# Patient Record
Sex: MALE | Employment: OTHER | URBAN - METROPOLITAN AREA
[De-identification: names, ages, dates, MRNs, and addresses within clinical notes are randomized per-mention and may not be internally consistent; named-entity substitution may affect disease eponyms.]

---

## 2020-10-27 ENCOUNTER — OFFICE VISIT (OUTPATIENT)
Dept: INFECTIOUS DISEASES | Facility: CLINIC | Age: 60
End: 2020-10-27
Payer: COMMERCIAL

## 2020-10-27 ENCOUNTER — CLINICAL SUPPORT (OUTPATIENT)
Dept: INFECTIOUS DISEASES | Facility: CLINIC | Age: 60
End: 2020-10-27
Payer: COMMERCIAL

## 2020-10-27 VITALS
HEART RATE: 59 BPM | WEIGHT: 194.25 LBS | HEIGHT: 73 IN | DIASTOLIC BLOOD PRESSURE: 92 MMHG | SYSTOLIC BLOOD PRESSURE: 140 MMHG | BODY MASS INDEX: 25.74 KG/M2 | TEMPERATURE: 99 F

## 2020-10-27 DIAGNOSIS — Z71.84 TRAVEL ADVICE ENCOUNTER: ICD-10-CM

## 2020-10-27 DIAGNOSIS — Z71.84 TRAVEL ADVICE ENCOUNTER: Primary | ICD-10-CM

## 2020-10-27 PROCEDURE — 99401 PREV MED CNSL INDIV APPRX 15: CPT | Mod: 25,S$GLB,, | Performed by: INTERNAL MEDICINE

## 2020-10-27 PROCEDURE — 90471 YELLOW FEVER VACCINE SQ: ICD-10-PCS | Mod: S$GLB,,, | Performed by: INTERNAL MEDICINE

## 2020-10-27 PROCEDURE — 99999 PR PBB SHADOW E&M-NEW PATIENT-LVL III: CPT | Mod: PBBFAC,,, | Performed by: INTERNAL MEDICINE

## 2020-10-27 PROCEDURE — 90471 IMMUNIZATION ADMIN: CPT | Mod: S$GLB,,, | Performed by: INTERNAL MEDICINE

## 2020-10-27 PROCEDURE — 90472 TYPHOID VICPS VACCINE IM: ICD-10-PCS | Mod: S$GLB,,, | Performed by: INTERNAL MEDICINE

## 2020-10-27 PROCEDURE — 90717 YELLOW FEVER VACCINE SUBQ: CPT | Mod: S$GLB,,, | Performed by: INTERNAL MEDICINE

## 2020-10-27 PROCEDURE — 90691 TYPHOID VACCINE IM: CPT | Mod: S$GLB,,, | Performed by: INTERNAL MEDICINE

## 2020-10-27 PROCEDURE — 90472 IMMUNIZATION ADMIN EACH ADD: CPT | Mod: S$GLB,,, | Performed by: INTERNAL MEDICINE

## 2020-10-27 PROCEDURE — 90691 TYPHOID VICPS VACCINE IM: ICD-10-PCS | Mod: S$GLB,,, | Performed by: INTERNAL MEDICINE

## 2020-10-27 PROCEDURE — 90717 YELLOW FEVER VACCINE SQ: ICD-10-PCS | Mod: S$GLB,,, | Performed by: INTERNAL MEDICINE

## 2020-10-27 PROCEDURE — 99401 PR PREVENT COUNSEL,INDIV,15 MIN: ICD-10-PCS | Mod: 25,S$GLB,, | Performed by: INTERNAL MEDICINE

## 2020-10-27 PROCEDURE — 99999 PR PBB SHADOW E&M-NEW PATIENT-LVL III: ICD-10-PCS | Mod: PBBFAC,,, | Performed by: INTERNAL MEDICINE

## 2020-10-27 RX ORDER — AZITHROMYCIN 500 MG/1
500 TABLET, FILM COATED ORAL DAILY
Qty: 3 TABLET | Refills: 0 | Status: CANCELLED | OUTPATIENT
Start: 2020-10-27 | End: 2020-10-30

## 2020-10-27 NOTE — PROGRESS NOTES
Subjective:      Patient ID: Sher Tellez is a 60 y.o. male.    Chief Complaint:Travel Consult ([place])      History of Present Illness    Patient here for travel counseling. He will be traveling to [facility] in [place].     Date of departure: 11/3/2020  Trip duration: 10 days  Lodging: local home  Visiting any other cities/municipalities: none  Recent patient reported vaccines:    Hepatitis A and B 10/23/2020   Td/Tdap 10/23/2020   Measles   Polio    Review of Systems   Constitution: Negative for chills, decreased appetite, fever, malaise/fatigue, night sweats, weight gain and weight loss.   HENT: Negative for congestion, ear pain, hearing loss, hoarse voice, sore throat and tinnitus.    Eyes: Negative for blurred vision, redness and visual disturbance.   Cardiovascular: Negative for chest pain, leg swelling and palpitations.   Respiratory: Negative for cough, hemoptysis, shortness of breath and sputum production.    Hematologic/Lymphatic: Negative for adenopathy. Does not bruise/bleed easily.   Skin: Negative for dry skin, itching, rash and suspicious lesions.   Musculoskeletal: Negative for back pain, joint pain, myalgias and neck pain.   Gastrointestinal: Negative for abdominal pain, constipation, diarrhea, heartburn, nausea and vomiting.   Genitourinary: Negative for dysuria, flank pain, frequency, hematuria, hesitancy and urgency.   Neurological: Negative for dizziness, headaches, numbness, paresthesias and weakness.   Psychiatric/Behavioral: Negative for depression and memory loss. The patient does not have insomnia and is not nervous/anxious.      Objective:   Physical Exam  Vitals signs and nursing note reviewed.   Constitutional:       Appearance: He is well-developed.   HENT:      Head: Normocephalic and atraumatic.   Eyes:      General: No scleral icterus.        Right eye: No discharge.         Left eye: No discharge.      Conjunctiva/sclera: Conjunctivae normal.   Pulmonary:      Effort: Pulmonary effort  is normal.   Musculoskeletal: Normal range of motion.   Skin:     General: Skin is warm and dry.   Neurological:      Mental Status: He is alert and oriented to person, place, and time.   Psychiatric:         Behavior: Behavior normal.         Thought Content: Thought content normal.         Judgment: Judgment normal.       Assessment:       1. Travel advice encounter          Plan:   Travel Counseling:     The Patient was provided with an extensive travel guidance packet which provides travel information specific to the patients itinerary.     The patient's medical history was reviewed and the patient was counseled on:   -Dietary precautions.   -Personal protective measures to prevent insect-borne diseases (e.g., malaria, dengue).   -Precautions to prevent exposure to rabies and seek treatment for possible exposures.   -Precautions against sun exposure.   -Precautions against development of DVT during flight.   -Personal and travel safety.    The patient's immunization history was reviewed and, based on the patient's itinerary, immunizations below were ordered:   Typhoid   Yellow fever    The patient was encouraged to contact us about any problems that may develop after immunization and possible side effects were reviewed.    The patient was instructed to purchase Imodium over the counter to take in case diarrhea (without blood or fever) develops. An antibiotic, azithromycin , was ordered for treatment if severe or bloody diarrhea develops and the patient was instructed on use and possible side effects.    The patient was also instructed to purchase insect repellent containing DEET or Picardin and apply according to repellent label instructions.      If indicated by the patients itinerary an anti-malarial agent was prescribed for malaria prophylaxis and possible side effects were reviewed. [place] does not currently require malaria prophylaxis.      The patient was instructed to contact us if problems develop  after travel.

## 2020-12-08 ENCOUNTER — HOSPITAL ENCOUNTER (OUTPATIENT)
Dept: MEDSURG UNIT | Facility: HOSPITAL | Age: 60
End: 2020-12-10
Attending: INTERNAL MEDICINE | Admitting: INTERNAL MEDICINE

## 2020-12-08 LAB
ABS NEUT (OLG): 8.94 X10(3)/MCL (ref 2.1–9.2)
ALBUMIN SERPL-MCNC: 4 GM/DL (ref 3.4–4.8)
ALBUMIN/GLOB SERPL: 1.2 RATIO (ref 1.1–2)
ALP SERPL-CCNC: 68 UNIT/L (ref 40–150)
ALT SERPL-CCNC: 31 UNIT/L (ref 0–55)
AST SERPL-CCNC: 26 UNIT/L (ref 5–34)
BASOPHILS # BLD AUTO: 0 X10(3)/MCL (ref 0–0.2)
BASOPHILS NFR BLD AUTO: 0 %
BILIRUB SERPL-MCNC: 0.5 MG/DL
BILIRUBIN DIRECT+TOT PNL SERPL-MCNC: 0.2 MG/DL (ref 0–0.5)
BILIRUBIN DIRECT+TOT PNL SERPL-MCNC: 0.3 MG/DL (ref 0–0.8)
BUN SERPL-MCNC: 14.7 MG/DL (ref 8.4–25.7)
CALCIUM SERPL-MCNC: 8.4 MG/DL (ref 8.8–10)
CHLORIDE SERPL-SCNC: 100 MMOL/L (ref 98–107)
CO2 SERPL-SCNC: 22 MMOL/L (ref 23–31)
CREAT SERPL-MCNC: 1.04 MG/DL (ref 0.73–1.18)
CRP SERPL HS-MCNC: 5.01 MG/DL
EOSINOPHIL # BLD AUTO: 0.1 X10(3)/MCL (ref 0–0.9)
EOSINOPHIL NFR BLD AUTO: 1 %
ERYTHROCYTE [DISTWIDTH] IN BLOOD BY AUTOMATED COUNT: 14.5 % (ref 11.5–17)
FERRITIN SERPL-MCNC: 296.57 NG/ML (ref 21.81–274.66)
GLOBULIN SER-MCNC: 3.4 GM/DL (ref 2.4–3.5)
GLUCOSE SERPL-MCNC: 119 MG/DL (ref 82–115)
HCT VFR BLD AUTO: 40.9 % (ref 42–52)
HGB BLD-MCNC: 13.4 GM/DL (ref 14–18)
LYMPHOCYTES # BLD AUTO: 0.6 X10(3)/MCL (ref 0.6–4.6)
LYMPHOCYTES NFR BLD AUTO: 6 %
MCH RBC QN AUTO: 30.5 PG (ref 27–31)
MCHC RBC AUTO-ENTMCNC: 32.8 GM/DL (ref 33–36)
MCV RBC AUTO: 93.2 FL (ref 80–94)
MONOCYTES # BLD AUTO: 1.2 X10(3)/MCL (ref 0.1–1.3)
MONOCYTES NFR BLD AUTO: 11 %
NEUTROPHILS # BLD AUTO: 8.94 X10(3)/MCL (ref 2.1–9.2)
NEUTROPHILS NFR BLD AUTO: 82 %
PLATELET # BLD AUTO: 257 X10(3)/MCL (ref 130–400)
PMV BLD AUTO: 9.8 FL (ref 9.4–12.4)
POTASSIUM SERPL-SCNC: 3.6 MMOL/L (ref 3.5–5.1)
PROT SERPL-MCNC: 7.4 GM/DL (ref 5.8–7.6)
RBC # BLD AUTO: 4.39 X10(6)/MCL (ref 4.7–6.1)
SARS-COV-2 RNA RESP QL NAA+PROBE: NOT DETECTED
SODIUM SERPL-SCNC: 133 MMOL/L (ref 136–145)
WBC # SPEC AUTO: 10.9 X10(3)/MCL (ref 4.5–11.5)

## 2020-12-10 LAB
ABS NEUT (OLG): 8.27 X10(3)/MCL (ref 2.1–9.2)
ALBUMIN SERPL-MCNC: 3.5 GM/DL (ref 3.4–4.8)
ALBUMIN/GLOB SERPL: 1.2 RATIO (ref 1.1–2)
ALP SERPL-CCNC: 65 UNIT/L (ref 40–150)
ALT SERPL-CCNC: 31 UNIT/L (ref 0–55)
AST SERPL-CCNC: 18 UNIT/L (ref 5–34)
BASOPHILS # BLD AUTO: 0 X10(3)/MCL (ref 0–0.2)
BASOPHILS NFR BLD AUTO: 0 %
BILIRUB SERPL-MCNC: 0.3 MG/DL
BILIRUBIN DIRECT+TOT PNL SERPL-MCNC: 0.1 MG/DL (ref 0–0.8)
BILIRUBIN DIRECT+TOT PNL SERPL-MCNC: 0.2 MG/DL (ref 0–0.5)
BUN SERPL-MCNC: 15 MG/DL (ref 8.4–25.7)
CALCIUM SERPL-MCNC: 8.9 MG/DL (ref 8.8–10)
CHLORIDE SERPL-SCNC: 101 MMOL/L (ref 98–107)
CO2 SERPL-SCNC: 23 MMOL/L (ref 23–31)
CREAT SERPL-MCNC: 0.93 MG/DL (ref 0.73–1.18)
D DIMER PPP IA.FEU-MCNC: 0.54 MCG/ML FEU (ref 0–0.5)
ERYTHROCYTE [DISTWIDTH] IN BLOOD BY AUTOMATED COUNT: 14.6 % (ref 11.5–17)
FLUAV AG UPPER RESP QL IA.RAPID: NEGATIVE
FLUAV AG UPPER RESP QL IA.RAPID: NOT DETECTED
FLUBV AG UPPER RESP QL IA.RAPID: NEGATIVE
FLUBV AG UPPER RESP QL IA.RAPID: NOT DETECTED
GLOBULIN SER-MCNC: 3 GM/DL (ref 2.4–3.5)
GLUCOSE SERPL-MCNC: 150 MG/DL (ref 82–115)
HCT VFR BLD AUTO: 44 % (ref 42–52)
HGB BLD-MCNC: 14.4 GM/DL (ref 14–18)
LYMPHOCYTES # BLD AUTO: 0.7 X10(3)/MCL (ref 0.6–4.6)
LYMPHOCYTES NFR BLD AUTO: 7 %
MCH RBC QN AUTO: 31 PG (ref 27–31)
MCHC RBC AUTO-ENTMCNC: 32.7 GM/DL (ref 33–36)
MCV RBC AUTO: 94.6 FL (ref 80–94)
MONOCYTES # BLD AUTO: 1 X10(3)/MCL (ref 0.1–1.3)
MONOCYTES NFR BLD AUTO: 10 %
NEUTROPHILS # BLD AUTO: 8.27 X10(3)/MCL (ref 2.1–9.2)
NEUTROPHILS NFR BLD AUTO: 83 %
PLATELET # BLD AUTO: 262 X10(3)/MCL (ref 130–400)
PMV BLD AUTO: 10.4 FL (ref 9.4–12.4)
POTASSIUM SERPL-SCNC: 4.5 MMOL/L (ref 3.5–5.1)
PROT SERPL-MCNC: 6.5 GM/DL (ref 5.8–7.6)
RBC # BLD AUTO: 4.65 X10(6)/MCL (ref 4.7–6.1)
SODIUM SERPL-SCNC: 134 MMOL/L (ref 136–145)
WBC # SPEC AUTO: 10 X10(3)/MCL (ref 4.5–11.5)

## 2022-04-30 NOTE — CONSULTS
Patient:   Sher Tellez             MRN: [id number]            FIN: [id number]               Age:   60 years     Sex:  Male     :  1960   Associated Diagnoses:   None   Author:   Ike DOMINGUEZ, [place]      Basic Information   Source of history:  Self, Medical record, Nursing.    Referral source:  Andrew Arreaga MD.    History limitation:  None.        Infectious disease evaluation and antibiotic management of a patient with acute febrile illness with suspicion of COVID-19 infection      History of Present Illness     60-year-old male, who is a  with no significant documented past medical history is admitted to [facility] on 2020, sent to to the hospital by his PMD, Dr. Arreaga for further evaluation of suspected COVID-19 infection.  Apparently he had presented to his office initially with complaints of shortness of breath and extreme fatigue and some fevers for about 2 days.  He had a documented fever of 39.9 at his office with tachycardia and tachypnea and hypoxic with O2 sat of 87% on room air.  COVID-19 test was done and the decision was to treat him for COVID-19 infection with Levaquin, Decadron and inhaled steroids.  His condition apparently had progressed to worsening shortness of breath and the decision was made to admit him to the hospital.  On presentation he was noted to have fevers of up to 102.3 with no leukocytosis and no leukopenia.  He had elevated CRP and ferritin and SARS-CoV-2 PCR was negative.  Chest x-ray showed no acute cardiopulmonary disease.  Apparently the COVID-19 test done as an outpatient at his PMDs office, per nursing staff, was also reported negative.  He had been placed on antibiotics with ceftriaxone, azithromycin and was also on dexamethasone but these have all been discontinued.  He remains on prophylactic Lovenox and has been comfortable on room air with good oxygen saturation.  His main complaints at this time is that of  insomnia and he has been given Ambien.  He denies any known contact/exposure with confirmed or suspected case of COVID-19 infection.         Review of Systems   Constitutional:  Fever, Fatigue.    Eye:  Negative.    Ear/Nose/Mouth/Throat:  Negative.    Respiratory:  Shortness of breath.    Cardiovascular:  Negative.    Gastrointestinal:  Negative.    Genitourinary:  Negative.    Hematology/Lymphatics:  Negative.    Endocrine:  Negative.    Immunologic:  Negative.    Musculoskeletal:  Negative.    Integumentary:  Negative.    Neurologic:  Negative.    Psychiatric:  Negative.    All other systems are negative      Health Status   Allergies:    Allergic Reactions (Selected)  Severity Not Documented  Penicillin- Unknown.   Current medications:  (Selected)   Inpatient Medications  Ordered  Lovenox: 40 mg, form: Injection, Subcutaneous, Daily, first dose 12/08/20 21:26:00 CST, STAT  Vitamin C 500 mg oral tablet: 500 mg, form: Tab, Oral, Daily, first dose 12/08/20 21:27:00 CST, STAT  ergocalciferol 50,000 intl units (1.25 mg) oral capsule: 50,000 units, form: Cap, Oral, Tu,F, first dose 12/11/20 9:00:00 CST, Routine  multivitamin with minerals: 1 tab(s), form: Tab, Oral, BID, first dose 12/09/20 21:00:00 CST  zinc sulfate 220 mg oral capsule: 220 mg, form: Cap, Oral, Daily, Order duration: 14 day(s), first dose 12/08/20 21:27:00 CST, stop date 12/22/20 8:59:00 CST, STAT      Histories   Past Medical History:    No active or resolved past medical history items have been selected or recorded.   Family History:    No family history items have been selected or recorded.   Procedure history:    No active procedure history items have been selected or recorded.   Social History        Social & Psychosocial Habits    Tobacco  12/08/2020  Use: Never (less than 100 in l    Patient Wants Consult For Cessation Counseling N/A    12/09/2020  Use: Never (less than 100 in l    Patient Wants Consult For Cessation Counseling  N/A    Abuse/Neglect  12/08/2020  SHX Any signs of abuse or neglect No    12/09/2020  SHX Any signs of abuse or neglect No  .        Physical Examination      Vital Signs (last 24 hrs)_____  Last Charted___________  Temp Oral     36.9 DegC  (DEC 09 23:05)  Heart Rate Peripheral   70 bpm  (DEC 09 23:05)  Resp Rate         17 br/min  (DEC 09 19:20)  SBP      H 143mmHg  (DEC 09 23:05)  DBP      84 mmHg  (DEC 09 23:05)  SpO2      95 %  (DEC 09 23:05)  Weight      90 kg  (DEC 09 13:09)  Height      184 cm  (DEC 09 13:09)  BMI      26.58  (DEC 09 13:09)     General:  Alert and oriented, No acute distress.    Eye:  Pupils are equal, round and reactive to light.    HENT:  Normocephalic.    Neck:  Supple, No jugular venous distention.    Respiratory:  Lungs are clear to auscultation, Respirations are non-labored, Breath sounds are equal, Symmetrical chest wall expansion.    Cardiovascular:  Normal rate, Regular rhythm, No gallop.    Gastrointestinal:  Soft, Non-tender, Non-distended, Normal bowel sounds.    Genitourinary:  No costovertebral angle tenderness.    Musculoskeletal:  No swelling, No deformity.    Integumentary:  No rash.    Neurologic:          Appearance/ responsiveness: Alert and appropriately responsive.         Cranial nerves: Intact.         Motor: Follows commands and moves his extremities.    Cognition and Speech:  Oriented, Speech clear and coherent.    Psychiatric:          Mood and affect: Calm.         Behavior: Cooperative.       Review / Management   Results review:     Labs (Last four charted values)  WBC                  10.9 (DEC 08)   Hgb                  L 13.4 (DEC 08)   Hct                  L 40.9 (DEC 08)   Plt                  257 (DEC 08)   Na                   L 133 (DEC 08)   K                    3.6 (DEC 08)   CO2                  L 22 (DEC 08)   Cl                   100 (DEC 08)   Cr                   1.04 (DEC 08)   BUN                  14.7 (DEC 08)   Glucose Random       H 119 (DEC  08) .        Reason For Exam  Dyspnea    Radiology Report  Clinical History  Shortness of breath     Technique  2 views of the chest.     Comparison  No prior imaging available for comparison.     Findings  Lungs are clear with no visualized focal airspace opacity.  The trachea appears midline.  The cardiomediastinal silhouette is within normal limits.  There is no evidence of pneumothorax or pleural effusion.  Visualized abdomen, soft tissues, and osseous structures are  unremarkable.     Impression  No acute cardiopulmonary process.            Impression and Plan   1.  Acute febrile illness with suspected COVID-19 infection  2.  Hypoxia by history  3.  Elevated CRP, Ferritin  4.  Insomnia    I agree with the management of this patient.  He is noted to have significant fever spikes with no leukocytosis or leukopenia but with elevated inflammatory markers.  His presentation at this time of high prevalence of COVID-19 infection in the community is very suspicious for COVID-19 infection and there have been cases of COVID-19-like illness with negative test results.  He is however not hypoxic at this time with good oxygen saturations on room air and so we will hold off from administration of Decadron or Remdesivir.  I will evaluate further with a respiratory PCR to include influenza A and B PCR, plan to work-up for fever of unknown origin if fevers persist with no apparent diagnosis.  He will be continued on prophylactic Lovenox, vitamin and mineral supplements as well as Ambien for insomnia as needed.  We will maintain him on COVID-19 transmission based precautions for now while attempting to establish alternate diagnosis.  Guarded prognosis.  Case is discussed with patient and nursing staff.  I would like to thank the team very much for the opportunity to assist in the care of this patient.

## 2022-04-30 NOTE — H&P
Patient:   Sher Tellez             MRN: [id number]            FIN: [id number]               Age:   60 years     Sex:  Male     :  1960   Associated Diagnoses:   Direct admit; Acute URI   Author:   Andrew Arreaga MD      Basic Information   Source of history:  Self, Medical record.    Present at bedside:  Medical personnel.    Referral source:  Direct physician admit.    History limitation:  None.       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  He also had loss of sense of taste and smell.  He hadn't been to eat much in the last couple days.  He does have myalgias and arthritis.  Upon evaluation my office he had a temperature of 39.9 C.  Pulse of 105 respirations about 22.  Saturations were at 87% on room air.  My physical auscultation of his lungs do demonstrate some fine crackles in the right main lung.  I did do a COVID-19 PCR test along with a PCR respiratory for viruses and other bacteria's.  I did start him on Levaquin and dexamethasone along with inhaled steroids however during the afternoon he saturations went down into the low 80s and he had trouble breathing I ordered him admitted to the emergency room.  An elevated factors was intent to treat as an outpatient.  Artery infarct versus exposure to COVID-19.  Treatment here is to continue her steroids and respiratory treatments and IV antibiotics.  COVID-19 here is negative but I'm awaiting my labs to confirm this.  His CRP is elevated and his ferritin level was elevated.  All signs of COVID-19 infection.      Review of Systems   Constitutional:  Negative except as documented in history of present illness, No fever, No chills, No sweats.    Eye:  No icterus, No discharge, No  double vision.    Ear/Nose/Mouth/Throat:  No decreased hearing, No ear pain.    Respiratory:  Negative, No sputum production, No hemoptysis.    Cardiovascular:  Negative.    Gastrointestinal:  Negative.    Genitourinary:  Negative.    Hematology/Lymphatics:  Negative.    Endocrine:  Negative.    Immunologic:  Negative.    Musculoskeletal:  Negative.    Integumentary:  Negative.    Neurologic:  Negative.    Psychiatric:  Negative.    All other systems are negative      Health Status   Allergies:    Allergic Reactions (Selected)  Severity Not Documented  Penicillin- Unknown.,    Allergies (1) Active Reaction  penicillin Unknown     Current medications:  (Selected)   Inpatient Medications  Ordered  Lovenox: 40 mg, form: Injection, Subcutaneous, Daily, first dose 12/08/20 21:26:00 CST, STAT  Protonix 40 mg Vial (IV Push): 40 mg, form: Injection, IV Slow, Daily, Infuse over: 2 minute(s), first dose 12/08/20 21:26:00 CST, STAT  Rocephin 1 g injection: 1,000 mg, form: Injection, IV Piggyback, q12hr, Infuse over: 0.5 hr, first dose 12/08/20 21:25:00 CST, STAT  Vitamin C 500 mg oral tablet: 500 mg, form: Tab, Oral, Daily, first dose 12/08/20 21:27:00 CST, STAT  azithromycin (Zithromax) for IVPB: 500 mg, form: Injection, IV Piggyback, Daily, Infuse over: 1 hr, first dose 12/08/20 21:25:00 CST, STAT  dexamethasone: 4 mg, form: Injection, IV Push, BID, first dose 12/08/20 21:26:00 CST, STAT  ergocalciferol 50,000 intl units (1.25 mg) oral capsule: 50,000 units, form: Cap, Oral, Tu,F, first dose 12/11/20 9:00:00 CST, Routine  fluticasone furoate 100 mcg inhalation powder: 1 inh, 100 mcg =, form: Powder, INH, Daily, first dose 12/08/20 21:28:00 CST  zinc sulfate 220 mg oral capsule: 220 mg, form: Cap, Oral, Daily, Order duration: 14 day(s), first dose 12/08/20 21:27:00 CST, stop date 12/22/20 8:59:00 CST, STAT,    Medications (9) Active  Scheduled: (9)  ascorbic acid 500 mg Tab UD  500 mg 1 tab(s), Oral, Daily  azithromycin  500  mg 1 EA, IV Piggyback, Daily  cefTRIAXone  1,000 mg 1 EA, IV Piggyback, q12hr  DEXAmethasone 4 mg/ml Inj-1ml  4 mg 1 mL, IV Push, BID  enoxaparin 40mg/0.4ml Inj  40 mg 0.4 mL, Subcutaneous, Daily  ergocalciferol 50,000 units Cap  50,000 units 1 cap(s), Oral, Tu,F  fluticasone furoate 100 mcg Powd  100 mcg 1 inh, INH, Daily  pantoprazole 40 mg Inj  40 mg 1 EA, IV Slow, Daily  zinc sulfate 220mg Cap  220 mg 1 cap(s), Oral, Daily  Continuous: (0)  PRN: (0)     Problem list:    No qualifying data available        Histories   Past Medical History:    No active or resolved past medical history items have been selected or recorded.   Family History:    No family history items have been selected or recorded.   Procedure history:    No active procedure history items have been selected or recorded.   Social History        Social & Psychosocial Habits    Tobacco  12/08/2020  Use: Never (less than 100 in l    Patient Wants Consult For Cessation Counseling N/A    12/09/2020  Use: Never (less than 100 in l    Patient Wants Consult For Cessation Counseling N/A    Abuse/Neglect  12/08/2020  SHX Any signs of abuse or neglect No    12/09/2020  SHX Any signs of abuse or neglect No  .        Physical Examination   General:  No acute distress.    Eye:  Vision unchanged.    HENT:  Normocephalic.    Neck:  Supple, Non-tender, No carotid bruit.    Respiratory:  Lungs are clear to auscultation, Respirations are non-labored, Breath sounds are equal, Symmetrical chest wall expansion.    Cardiovascular:  Normal rate, Regular rhythm, No murmur.    Gastrointestinal:  Soft, Non-tender, Non-distended.    Vital Signs   12/9/2020 3:12 CST       Temperature Oral          37.7 DegC                             Temperature Oral (calculated)             99.86 DegF                             Respiratory Rate          19 br/min                             SpO2                      95 %                             Oxygen Therapy            Room air                              Systolic Blood Pressure   118 mmHg                             Diastolic Blood Pressure  76 mmHg                             Mean Arterial Pressure, Cuff              90 mmHg    12/9/2020 2:02 CST       Peripheral Pulse Rate     67 bpm                             Heart Rate Monitored      67 bpm                             Respiratory Rate          19 br/min                             SpO2                      95 %                             Oxygen Therapy            Room air                             Systolic Blood Pressure   114 mmHg                             Diastolic Blood Pressure  76 mmHg                             Mean Arterial Pressure, Cuff              89 mmHg    12/9/2020 0:00 CST       Temperature Oral          37.7 DegC                             Temperature Oral (calculated)             99.86 DegF                             Peripheral Pulse Rate     72 bpm                             Heart Rate Monitored      72 bpm                             Respiratory Rate          18 br/min                             SpO2                      95 %                             Oxygen Therapy            Room air                             Systolic Blood Pressure   122 mmHg                             Diastolic Blood Pressure  73 mmHg                             Mean Arterial Pressure, Cuff              89 mmHg    12/8/2020 23:10 CST      Peripheral Pulse Rate     73 bpm                             Heart Rate Monitored      73 bpm                             Respiratory Rate          17 br/min                             SpO2                      96 %                             Oxygen Therapy            Room air                             Systolic Blood Pressure   112 mmHg                             Diastolic Blood Pressure  60 mmHg                             Mean Arterial Pressure, Cuff              77 mmHg    12/8/2020 22:14 CST      Temperature Oral          38.2 DegC  HI                              Temperature Oral (calculated)             100.76 DegF                             Peripheral Pulse Rate     82 bpm                             Heart Rate Monitored      81 bpm                             Respiratory Rate          18 br/min                             SpO2                      93 %  LOW                             Oxygen Therapy            Room air                             Systolic Blood Pressure   113 mmHg                             Diastolic Blood Pressure  69 mmHg                             Mean Arterial Pressure, Cuff              84 mmHg    12/8/2020 21:08 CST      Temperature Oral          39.1 DegC  HI                             Temperature Oral (calculated)             102.38 DegF                             Peripheral Pulse Rate     89 bpm                             Respiratory Rate          18 br/min                             SpO2                      94 %                             Oxygen Therapy            Room air                             Systolic Blood Pressure   128 mmHg                             Diastolic Blood Pressure  83 mmHg        Vital Signs (last 24 hrs)_____  Last Charted___________  Temp Oral     37.7 DegC  (DEC 09 03:12)  Heart Rate Peripheral   67 bpm  (DEC 09 02:02)  Resp Rate         19 br/min  (DEC 09 03:12)  SBP      118 mmHg  (DEC 09 03:12)  DBP      76 mmHg  (DEC 09 03:12)  SpO2      95 %  (DEC 09 03:12)     Genitourinary:  No costovertebral angle tenderness.    Lymphatics:  No lymphadenopathy neck, axilla, groin.    Musculoskeletal:  No tenderness, No swelling.    Integumentary:  Warm.    Neurologic:  Alert, Oriented, No focal deficits, Cranial Nerves II-XII are grossly intact.    Cognition and Speech:  Oriented, Speech clear and coherent.    Psychiatric:  Cooperative, Appropriate mood & affect.       Review / Management   Laboratory Results   Last 5 Days Lab Results : PowerNote Discrete Results   12/8/2020 22:33 CST      Est Creat  Clearance Ser   83.99 mL/min    12/8/2020 22:12 CST      SARS-CoV-2 PCR            Not Detected    12/8/2020 21:57 CST      WBC                       10.9 x10(3)/mcL                             RBC                       4.39 x10(6)/mcL  LOW                             Hgb                       13.4 gm/dL  LOW                             Hct                       40.9 %  LOW                             Platelet                  257 x10(3)/mcL                             MCV                       93.2 fL                             MCH                       30.5 pg                             MCHC                      32.8 gm/dL  LOW                             RDW                       14.5 %                             MPV                       9.8 fL                             Abs Neut                  8.94 x10(3)/mcL                             Neutro Auto               82 %  NA                             Lymph Auto                6 %  NA                             Mono Auto                 11 %  NA                             Eos Auto                  1 %  NA                             Abs Eos                   0.1 x10(3)/mcL                             Basophil Auto             0 %  NA                             Abs Neutro                8.94 x10(3)/mcL                             Abs Lymph                 0.6 x10(3)/mcL                             Abs Mono                  1.2 x10(3)/mcL                             Abs Baso                  0.0 x10(3)/mcL                             Sodium Lvl                133 mmol/L  LOW                             Potassium Lvl             3.6 mmol/L                             Chloride                  100 mmol/L                             CO2                       22 mmol/L  LOW                             Calcium Lvl               8.4 mg/dL  LOW                             Glucose Lvl               119 mg/dL  HI                             BUN                        14.7 mg/dL                             Creatinine                1.04 mg/dL                             eGFR-AA                   >60  NA                             eGFR-JUSTIN                  >60 mL/min/1.73 m2  NA                             Bili Total                0.5 mg/dL                             Bili Direct               0.2 mg/dL                             Bili Indirect             0.30 mg/dL                             AST                       26 unit/L                             ALT                       31 unit/L                             Alk Phos                  68 unit/L                             Total Protein             7.4 gm/dL                             Albumin Lvl               4.0 gm/dL                             Globulin                  3.4 gm/dL                             A/G Ratio                 1.2 ratio                             Ferritin Lvl              296.57 ng/mL  HI                             CRP High Sens             5.01 mg/dL  HI        Condition:  Fair.       Impression and Plan   Diagnosis     Direct admit (PNED [id number]).     Acute URI (DUJ21-GD J06.9).     Course:  Improving, The steroids and the IV antibiotics haven't improved his symptoms.  However don't have a complete diagnosis and I reduce highly suspect COVID-19.  I've ordered him to be placed in respiratory isolation.  I will await my confirmatory labs..    Diagnosis     Direct admit (PNED [id number]).     Acute URI (NMA59-DF J06.9).     Course:  Improving.    Education and Follow-up:       Counseled: Patient, Regarding diagnosis, Regarding treatment, Regarding medications.

## 2022-04-30 NOTE — H&P
Patient:   Sher Tellez             MRN: [id number]            FIN: [id number]               Age:   60 years     Sex:  Male     :  1960   Associated Diagnoses:   None   Author:   Andrew Arreaga MD      Patient seen by I.D. He recomended that we walk patient to check his oxygen saturations.  Patient got dizzy and his pulse and blood pressure shot up. I don't find him stable enough to be discharged.

## 2022-04-30 NOTE — CONSULTS
Patient:   Sher Tellez             MRN: [id number]            FIN: [id number]               Age:   60 years     Sex:  Male     :  1960   Associated Diagnoses:   None   Author:   Kanwal DOMINGUEZ, Lan Sharpe  Sher Tellez is a 60-year-old male with no significant past medical history who was admitted to [facility] on  by his PCP, Dr. Arreaga.  Patient states that on the evening of , he began experiencing severe fatigue and generalized weakness.  He recorded a temperature of 100.9 at that time. He is a  and experienced these symptoms a few hours after mass.  His symptoms continued the next day without improvement.  He then had a fever of 102 the morning of .  He presented to his PCP's office in the afternoon of  where COVID-19 swab and respiratory PCR panel was performed.  According to hospitalist/PCP H&P, patient's O2 sats were in the mid 80s on room air in the office.  He was then directly admitted for suspected COVID-19 pneumonitis.    On exam, patient resting in no acute distress on room air.  Denies ever feeling short of breath.  Endorses a mild nonproductive cough.    Objective  Vital Signs (last 24 hrs)_____  Last Charted___________  Temp Oral     37 DegC  (DEC 09 13:)  Heart Rate Peripheral   85 bpm  (DEC 09 13:)  Resp Rate         20 br/min  (DEC 09 13:)  SBP      125 mmHg  (DEC 09 13:)  DBP      82 mmHg  (DEC 09 13:)  SpO2      96 %  (DEC 09 13:)  Weight      90 kg  (DEC 09 13:09)  Height      184 cm  (DEC 09 13:)  BMI      26.58  (DEC 09 13:09)     General: Well appearing male in NAD. AAO x 3.  HEENT: Normocephalic, atraumatic.  Neck: Supple, non-tender. No lymphadenopathy or thyromegaly.  Cardiovascular: RRR, normal S1, S2. No murmurs, rubs, or extra heart sounds noted.  Respiratory: CTABL. No rales, wheezes, or rhonchi.  Abdomen: Non-tender, non-distended. Normoactive bowel sounds. No hepatosplenomegaly.  Extremities: No clubbing, cyanosis, or edema  noted. Normal ROM.  Skin: No rashes or lesions noted.    Labs  Labs Last 24 Hours   Chemistry  Hematology/Coagulation    Sodium Lvl: 133 mmol/L Low (12/08/20 21:57:00) WBC: 10.9 x10(3)/mcL (12/08/20 21:57:00)   Potassium Lvl: 3.6 mmol/L (12/08/20 21:57:00) RBC: 4.39 x10(6)/mcL Low (12/08/20 21:57:00)   Chloride: 100 mmol/L (12/08/20 21:57:00) Hgb: 13.4 gm/dL Low (12/08/20 21:57:00)   CO2: 22 mmol/L Low (12/08/20 21:57:00) Hct: 40.9 % Low (12/08/20 21:57:00)   Calcium Lvl: 8.4 mg/dL Low (12/08/20 21:57:00) Platelet: 257 x10(3)/mcL (12/08/20 21:57:00)   Glucose Lvl: 119 mg/dL High (12/08/20 21:57:00) MCV: 93.2 fL (12/08/20 21:57:00)   BUN: 14.7 mg/dL (12/08/20 21:57:00) MCH: 30.5 pg (12/08/20 21:57:00)   Creatinine: 1.04 mg/dL (12/08/20 21:57:00) MCHC: 32.8 gm/dL Low (12/08/20 21:57:00)   Est Creat Clearance Ser: 83.99 mL/min (12/08/20 22:33:13) RDW: 14.5 % (12/08/20 21:57:00)   eGFR-AA: >60 (12/08/20 21:57:00) MPV: 9.8 fL (12/08/20 21:57:00)   eGFR-JUSTIN: >60 (12/08/20 21:57:00) Abs Neut: 8.94 x10(3)/mcL (12/08/20 21:57:00)   Bili Total: 0.5 mg/dL (12/08/20 21:57:00) Neutro Auto: 82 % (12/08/20 21:57:00)   Bili Direct: 0.2 mg/dL (12/08/20 21:57:00) Lymph Auto: 6 % (12/08/20 21:57:00)   Bili Indirect: 0.3 mg/dL (12/08/20 21:57:00) Mono Auto: 11 % (12/08/20 21:57:00)   AST: 26 unit/L (12/08/20 21:57:00) Eos Auto: 1 % (12/08/20 21:57:00)   ALT: 31 unit/L (12/08/20 21:57:00) Abs Eos: 0.1 x10(3)/mcL (12/08/20 21:57:00)   Alk Phos: 68 unit/L (12/08/20 21:57:00) Basophil Auto: 0 % (12/08/20 21:57:00)   Total Protein: 7.4 gm/dL (12/08/20 21:57:00) Abs Neutro: 8.94 x10(3)/mcL (12/08/20 21:57:00)   Albumin Lvl: 4 gm/dL (12/08/20 21:57:00) Abs Lymph: 0.6 x10(3)/mcL (12/08/20 21:57:00)   Globulin: 3.4 gm/dL (12/08/20 21:57:00) Abs Mono: 1.2 x10(3)/mcL (12/08/20 21:57:00)   A/G Ratio: 1.2 ratio (12/08/20 21:57:00) Abs Baso: 0 x10(3)/mcL (12/08/20 21:57:00)   Ferritin Lvl: 296.57 ng/mL High (12/08/20 21:57:00)    CRP High Sens: 5.01  mg/dL High (12/08/20 21:57:00)      Radiology  Accession: [id number]  Order: XR Chest 2 Views  Report Dt/Tm: 12/09/2020 08:49  Report:   Clinical History  Shortness of breath     Technique  2 views of the chest.     Comparison  No prior imaging available for comparison.     Findings  Lungs are clear with no visualized focal airspace opacity.  The trachea appears midline.  The cardiomediastinal silhouette is within normal limits.  There is no evidence of pneumothorax or pleural effusion.  Visualized abdomen, soft tissues, and osseous structures are  unremarkable.     Impression  No acute cardiopulmonary process.      Assessment/Plan  Suspected COVID-19 pneumonitis    Patient admitted due to suspected COVID-19 pneumonitis. Patient has tested negative for COVID and has O2 sats between 94-96% on room air. He also has a normal CXR and elevated inflammatory markers which could be due to many other diagnoses besides COVID-19. Recommend discontinuing azithromycin, ceftriaxone, dexamethasone, fluticasone, and Protonix and starting a 5-day course of prednisone 20 mg daily.  Recommend walking the patient over a short distance for a few minutes to see if his O2 sats decrease with exertion.  Patient okay to be discharged from our standpoint pending measurement of O2 sats with exertion.    Lan Schofield MD   PGY-III  Pager [phone]